# Patient Record
Sex: FEMALE | Race: WHITE
[De-identification: names, ages, dates, MRNs, and addresses within clinical notes are randomized per-mention and may not be internally consistent; named-entity substitution may affect disease eponyms.]

---

## 2017-07-27 ENCOUNTER — HOSPITAL ENCOUNTER (EMERGENCY)
Dept: HOSPITAL 17 - PHED | Age: 62
Discharge: HOME | End: 2017-07-27
Payer: COMMERCIAL

## 2017-07-27 VITALS
RESPIRATION RATE: 20 BRPM | OXYGEN SATURATION: 93 % | HEART RATE: 89 BPM | SYSTOLIC BLOOD PRESSURE: 137 MMHG | DIASTOLIC BLOOD PRESSURE: 58 MMHG

## 2017-07-27 VITALS
HEART RATE: 101 BPM | SYSTOLIC BLOOD PRESSURE: 192 MMHG | DIASTOLIC BLOOD PRESSURE: 95 MMHG | OXYGEN SATURATION: 92 % | RESPIRATION RATE: 18 BRPM | TEMPERATURE: 97.8 F

## 2017-07-27 VITALS — DIASTOLIC BLOOD PRESSURE: 64 MMHG | SYSTOLIC BLOOD PRESSURE: 167 MMHG

## 2017-07-27 VITALS — WEIGHT: 172.62 LBS | BODY MASS INDEX: 28.76 KG/M2 | HEIGHT: 65 IN

## 2017-07-27 VITALS — OXYGEN SATURATION: 96 %

## 2017-07-27 VITALS — OXYGEN SATURATION: 97 %

## 2017-07-27 DIAGNOSIS — I10: ICD-10-CM

## 2017-07-27 DIAGNOSIS — R51: ICD-10-CM

## 2017-07-27 DIAGNOSIS — J44.1: Primary | ICD-10-CM

## 2017-07-27 DIAGNOSIS — Z79.899: ICD-10-CM

## 2017-07-27 DIAGNOSIS — Z72.0: ICD-10-CM

## 2017-07-27 LAB
ANION GAP SERPL CALC-SCNC: 9 MEQ/L (ref 5–15)
APTT BLD: 23.8 SEC (ref 24.3–30.1)
BASE EXCESS BLD CALC-SCNC: 0 MMOL/L (ref -2–2)
BASOPHILS # BLD AUTO: 0 TH/MM3 (ref 0–0.2)
BASOPHILS NFR BLD: 0.3 % (ref 0–2)
BENZODIAZEPINES PNL UR: 91 % (ref 90–100)
BLOOD GAS CARBOXYHEMOGLOBIN: 2.4 % (ref 0–4)
BLOOD GAS HCO3: 24 MMOL/L (ref 22–26)
BLOOD GAS OXYGEN CONTENT: 18.8 VOL % (ref 12–20)
BLOOD GAS PCO2: 37 MMHG (ref 38–42)
BUN SERPL-MCNC: 14 MG/DL (ref 7–18)
CHLORIDE SERPL-SCNC: 102 MEQ/L (ref 98–107)
CRITICAL VALUE: NO
DRAW SITE: (no result)
EOSINOPHIL # BLD: 0 TH/MM3 (ref 0–0.4)
EOSINOPHIL NFR BLD: 0.1 % (ref 0–4)
ERYTHROCYTE [DISTWIDTH] IN BLOOD BY AUTOMATED COUNT: 12.4 % (ref 11.6–17.2)
GFR SERPLBLD BASED ON 1.73 SQ M-ARVRAT: 60 ML/MIN (ref 89–?)
HCO3 BLD-SCNC: 26.4 MEQ/L (ref 21–32)
HCT VFR BLD CALC: 43 % (ref 35–46)
HEMO FLAGS: (no result)
INR PPP: 0.9 RATIO
LYMPHOCYTES # BLD AUTO: 2.3 TH/MM3 (ref 1–4.8)
LYMPHOCYTES NFR BLD AUTO: 18.4 % (ref 9–44)
MAGNESIUM SERPL-MCNC: 2 MG/DL (ref 1.5–2.5)
MCH RBC QN AUTO: 29.7 PG (ref 27–34)
MCHC RBC AUTO-ENTMCNC: 33.7 % (ref 32–36)
MCV RBC AUTO: 88.2 FL (ref 80–100)
METHGB MFR BLDA: 1.2 % (ref 0–2)
MONOCYTES NFR BLD: 2.9 % (ref 0–8)
NEUTROPHILS # BLD AUTO: 9.8 TH/MM3 (ref 1.8–7.7)
NEUTROPHILS NFR BLD AUTO: 78.3 % (ref 16–70)
NUMBER OF ARTERIAL PUNCTURES: 1
O2/TOTAL GAS SETTING VFR VENT: 21 %
OXYGEN DEVICE: (no result)
PLATELET # BLD: 420 TH/MM3 (ref 150–450)
PO2 BLD: 71 MMHG (ref 61–120)
POTASSIUM SERPL-SCNC: 4.2 MEQ/L (ref 3.5–5.1)
PROTHROMBIN TIME: 9.9 SEC (ref 9.8–11.6)
RBC # BLD AUTO: 4.87 MIL/MM3 (ref 4–5.3)
SALICYLATES SERPL-MCNC: 14.7 G/DL (ref 12–16)
SODIUM SERPL-SCNC: 137 MEQ/L (ref 136–145)
STAT: YES
TEMP CORR TO: 98.6
ULNAR PULSE: PRESENT
WBC # BLD AUTO: 12.5 TH/MM3 (ref 4–11)

## 2017-07-27 PROCEDURE — 80048 BASIC METABOLIC PNL TOTAL CA: CPT

## 2017-07-27 PROCEDURE — 85025 COMPLETE CBC W/AUTO DIFF WBC: CPT

## 2017-07-27 PROCEDURE — 84484 ASSAY OF TROPONIN QUANT: CPT

## 2017-07-27 PROCEDURE — 93005 ELECTROCARDIOGRAM TRACING: CPT

## 2017-07-27 PROCEDURE — 94640 AIRWAY INHALATION TREATMENT: CPT

## 2017-07-27 PROCEDURE — 70450 CT HEAD/BRAIN W/O DYE: CPT

## 2017-07-27 PROCEDURE — 83880 ASSAY OF NATRIURETIC PEPTIDE: CPT

## 2017-07-27 PROCEDURE — 96375 TX/PRO/DX INJ NEW DRUG ADDON: CPT

## 2017-07-27 PROCEDURE — 94664 DEMO&/EVAL PT USE INHALER: CPT

## 2017-07-27 PROCEDURE — 85610 PROTHROMBIN TIME: CPT

## 2017-07-27 PROCEDURE — 82805 BLOOD GASES W/O2 SATURATION: CPT

## 2017-07-27 PROCEDURE — 71010: CPT

## 2017-07-27 PROCEDURE — 96374 THER/PROPH/DIAG INJ IV PUSH: CPT

## 2017-07-27 PROCEDURE — 85730 THROMBOPLASTIN TIME PARTIAL: CPT

## 2017-07-27 PROCEDURE — 99285 EMERGENCY DEPT VISIT HI MDM: CPT

## 2017-07-27 PROCEDURE — 83735 ASSAY OF MAGNESIUM: CPT

## 2017-07-27 PROCEDURE — 36600 WITHDRAWAL OF ARTERIAL BLOOD: CPT

## 2017-07-27 RX ADMIN — IPRATROPIUM BROMIDE AND ALBUTEROL SULFATE SCH AMPULE: .5; 3 SOLUTION RESPIRATORY (INHALATION) at 13:55

## 2017-07-27 NOTE — RADRPT
EXAM DATE/TIME:  07/27/2017 14:20 

 

[facility] COMPARISON:     

No previous studies available for comparison.

 

 

INDICATIONS :     

Cephalgia. 

                      

 

RADIATION DOSE:     

60.83 CTDIvol (mGy) 

 

 

 

MEDICAL HISTORY :     

Chronic obstructive pulmonary disease. Hypertension. 

 

SURGICAL HISTORY :      

Cholecystectomy. Tubal ligation.

 

ENCOUNTER:      

Initial

 

ACUITY:      

1 week

 

PAIN SCALE:      

6/10

 

LOCATION:        

cranial 

 

TECHNIQUE:     

Multiple contiguous axial images were obtained of the head.  Using automated exposure control and adj
ustment of the mA and/or kV according to patient size, radiation dose was kept as low as reasonably a
chievable to obtain optimal diagnostic quality images.   DICOM format image data is available electro
nically for review and comparison.  

 

FINDINGS:     

 

CEREBRUM:     

The ventricles are normal for age.  No evidence of midline shift, mass lesion, hemorrhage or acute in
farction.  No extra-axial fluid collections are seen.

 

POSTERIOR FOSSA:     

The cerebellum and brainstem are intact.  The 4th ventricle is midline.  The cerebellopontine angle i
s unremarkable.

 

EXTRACRANIAL:     

The visualized portion of the orbits is intact.

 

SKULL:     

The calvaria is intact.  No evidence of skull fracture.

 

CONCLUSION:     

1. No acute intracranial normality.

 

 

 

 Tyler Asher MD on July 27, 2017 at 14:54           

Board Certified Radiologist.

 This report was verified electronically.

## 2017-07-27 NOTE — PD
HPI


Chief Complaint:  Respiratory Symptoms


Time Seen by Provider:  13:36


Travel History


International Travel<30 days:  No


Contact w/Intl Traveler<30days:  No


Traveled to known affect area:  No





History of Present Illness


HPI


63yo F with PMH of COPD presents to the ED with c/o persistent sob that is 

worst the last 3 days.  Pt was seen by her pulmonologist on July 18 and was 

given azithromycin and steroids.  Pt finished with the azithromycin and is 

still on steroid taper.  States usually she will feel better by now but is not 

feeling better.  Feels midsternal chest tightness as well with coughing.  No 

chest pain.  Feels like her COPD.  +Cough.  Denies any fever, n/v, abdominal 

pain.  Pt also with occipital headache for a week.  States she sometimes gets 

headaches when she has COPD exacerbations but usually motrin helps.  No focal 

weakness or numbness or trauma.





PFSH


Past Medical History


Arthritis:  Yes


Asthma:  Yes


Heart Rhythm Problems:  No


Cancer:  Yes (SKIN)


Cardiovascular Problems:  Yes (HTN)


High Cholesterol:  Yes


Chest Pain:  No


Congestive Heart Failure:  No


COPD:  Yes


Cerebrovascular Accident:  No


Diminished Hearing:  No


GERD:  No


Genitourinary:  No


Headaches:  No


Hepatitis:  No


Hiatal Hernia:  Yes


Hypertension:  Yes


Kidney Stones:  No


Musculoskeletal:  Yes


Neurologic:  No


Psychiatric:  Yes


Reproductive:  No


Respiratory:  Yes (COPD)


Migraines:  No


Myocardial Infarction:  No


Renal Failure:  No


Seizures:  No


Sleep Apnea:  No


Ulcer:  No


Tetanus Vaccination:  Unknown


Influenza Vaccination:  No


Pregnant?:  Not Pregnant


Menopausal:  Yes


Tubal Ligation:  Yes





Past Surgical History


Abdominal Surgery:  Yes (CHOLECYSTECTOMY)


Appendectomy:  No


Cardiac Surgery:  No


Cholecystectomy:  Yes


Ear Surgery:  No


Endocrine Surgery:  No


Eye Surgery:  No


Genitourinary Surgery:  No


Gynecologic Surgery:  Yes (TUBAL LIGATION)


Oral Surgery:  No


Thoracic Surgery:  No


Other Surgery:  Yes (cancer in neck, removed)





Social History


Alcohol Use:  No


Tobacco Use:  Yes (occ)


Substance Use:  No





Allergies-Medications


(Allergen,Severity, Reaction):  


Coded Allergies:  


     No Known Allergies (Verified , 7/27/17)


Reported Meds & Prescriptions





Reported Meds & Active Scripts


Active


Reported


Advair Diskus Inh (Fluticasone-Salmeterol Inh) 250-50 Mcg/Blist Aer 1 Puff INH 

BID


     Rinse mouth after use.


Triamterene-Hydrochlorothiazide 37.5-25 Mg Cap 1 Cap PO DAILY








Review of Systems


Except as stated in HPI:  all other systems reviewed are Neg





Physical Exam


Narrative


GENERAL: 63yo F in mild distress.


SKIN: Focused skin assessment warm/dry.


HEAD: Atraumatic. Normocephalic. 


EYES: Pupils equal and round. No scleral icterus. No injection or drainage. 


ENT: No nasal bleeding or discharge.  Mucous membranes pink and moist.


NECK: Trachea midline. No JVD. 


CARDIOVASCULAR: Regular rate and rhythm.  No murmur appreciated.


RESPIRATORY: Mild accessory muscle use. Expiratory wheezing bilaterally.  

Saturating well at 94% on RA.


GASTROINTESTINAL: Abdomen soft, non-tender, nondistended. 


MUSCULOSKELETAL: No obvious deformities. No clubbing.  No cyanosis.  +Trace 

lower extremity edema. No calf tenderness.


NEUROLOGICAL: Awake and alert. No obvious cranial nerve deficits.  Motor 

grossly within normal limits. Normal speech.


PSYCHIATRIC: Appropriate mood and affect; insight and judgment normal.





Data


Data


Last Documented VS





Vital Signs








  Date Time  Temp Pulse Resp B/P Pulse Ox O2 Delivery O2 Flow Rate FiO2


 


7/27/17 14:56  80   98 Room Air  


 


7/27/17 14:47   20 137/58    


 


7/27/17 13:21 97.8       








Orders





 Complete Blood Count With Diff (7/27/17 13:43)


Basic Metabolic Panel (Bmp) (7/27/17 13:43)


B-Type Natriuretic Peptide (7/27/17 13:43)


Act Partial Throm Time (Ptt) (7/27/17 13:43)


Prothrombin Time / Inr (Pt) (7/27/17 13:43)


Magnesium (Mg) (7/27/17 13:43)


Troponin I (7/27/17 13:43)


Arterial Blood Gas (Abg) (7/27/17 13:43)


Iv Access Insert/Monitor (7/27/17 13:43)


Electrocardiogram (7/27/17 13:43)


Ecg Monitoring (7/27/17 13:43)


Oximetry (7/27/17 13:43)


Oxygen Administration (7/27/17 13:43)


Chest, Single Ap (7/27/17 13:43)


Sodium Chloride 0.9% Flush (Ns Flush) (7/27/17 13:45)


Methylprednisolone So Succ Inj (Solumedr (7/27/17 13:45)


Albuterol-Ipratropium Neb (Duoneb Neb) (7/27/17 13:45)


Ct Brain W/O Iv Contrast(Rout) (7/27/17 )


Ketorolac Inj (Toradol Inj) (7/27/17 15:15)





Labs








 Laboratory Tests








Test 7/27/17 7/27/17





 13:55 13:58


 


White Blood Count 12.5 TH/MM3 


 


Red Blood Count 4.87 MIL/MM3 


 


Hemoglobin 14.5 GM/DL 


 


Hematocrit 43.0 % 


 


Mean Corpuscular Volume 88.2 FL 


 


Mean Corpuscular Hemoglobin 29.7 PG 


 


Mean Corpuscular Hemoglobin 33.7 % 





Concent  


 


Red Cell Distribution Width 12.4 % 


 


Platelet Count 420 TH/MM3 


 


Mean Platelet Volume 7.4 FL 


 


Neutrophils (%) (Auto) 78.3 % 


 


Lymphocytes (%) (Auto) 18.4 % 


 


Monocytes (%) (Auto) 2.9 % 


 


Eosinophils (%) (Auto) 0.1 % 


 


Basophils (%) (Auto) 0.3 % 


 


Neutrophils # (Auto) 9.8 TH/MM3 


 


Lymphocytes # (Auto) 2.3 TH/MM3 


 


Monocytes # (Auto) 0.4 TH/MM3 


 


Eosinophils # (Auto) 0.0 TH/MM3 


 


Basophils # (Auto) 0.0 TH/MM3 


 


CBC Comment DIFF FINAL  


 


Differential Comment   


 


Prothrombin Time 9.9 SEC 


 


Prothromb Time International 0.9 RATIO 





Ratio  


 


Activated Partial 23.8 SEC 





Thromboplast Time  


 


Sodium Level 137 MEQ/L 


 


Potassium Level 4.2 MEQ/L 


 


Chloride Level 102 MEQ/L 


 


Carbon Dioxide Level 26.4 MEQ/L 


 


Anion Gap 9 MEQ/L 


 


Blood Urea Nitrogen 14 MG/DL 


 


Creatinine 0.94 MG/DL 


 


Estimat Glomerular Filtration 60 ML/MIN 





Rate  


 


Random Glucose 125 MG/DL 


 


Calcium Level 9.5 MG/DL 


 


Magnesium Level 2.0 MG/DL 


 


Troponin I LESS THAN 0.02 





 NG/ML 


 


B-Type Natriuretic Peptide 36 PG/ML 


 


Blood Gas Puncture Site  LT RADIAL 


 


Blood Gas Patient Temperature  98.6 


 


Blood Gas HCO3  24 mmol/L


 


Blood Gas Base Excess  0.0 mmol/L


 


Blood Gas Oxygen Saturation  91 %


 


Arterial Blood pH  7.42 


 


Arterial Blood Partial  37 mmHG





Pressure CO2  


 


Arterial Blood Partial  71 mmHG





Pressure O2  


 


Arterial Blood Oxygen Content  18.8 Vol %


 


Arterial Blood  2.4 %





Carboxyhemoglobin  


 


Arterial Blood Methemoglobin  1.2 %


 


Blood Gas Hemoglobin  14.7 G/DL


 


Oxygen Delivery Device  RA 


 


Blood Gas Inspired Oxygen  21 %














MDM


Medical Decision Making


Medical Screen Exam Complete:  Yes


Emergency Medical Condition:  Yes


Interpretation(s)


EKG: NSR 83bpm.  Normal axis.  No ST segment elevation or depression.  





Laboratory Tests








Test 7/27/17 7/27/17





 13:55 13:58


 


White Blood Count 12.5 TH/MM3 





 (4.0-11.0) 


 


Red Blood Count 4.87 MIL/MM3 





 (4.00-5.30) 


 


Hemoglobin 14.5 GM/DL 





 (11.6-15.3) 


 


Hematocrit 43.0 % 





 (35.0-46.0) 


 


Mean Corpuscular Volume 88.2 FL 





 (80.0-100.0) 


 


Mean Corpuscular Hemoglobin 29.7 PG 





 (27.0-34.0) 


 


Mean Corpuscular Hemoglobin 33.7 % 





Concent (32.0-36.0) 


 


Red Cell Distribution Width 12.4 % 





 (11.6-17.2) 


 


Platelet Count 420 TH/MM3 





 (150-450) 


 


Mean Platelet Volume 7.4 FL 





 (7.0-11.0) 


 


Neutrophils (%) (Auto) 78.3 % 





 (16.0-70.0) 


 


Lymphocytes (%) (Auto) 18.4 % 





 (9.0-44.0) 


 


Monocytes (%) (Auto) 2.9 % (0.0-8.0) 


 


Eosinophils (%) (Auto) 0.1 % (0.0-4.0) 


 


Basophils (%) (Auto) 0.3 % (0.0-2.0) 


 


Neutrophils # (Auto) 9.8 TH/MM3 





 (1.8-7.7) 


 


Lymphocytes # (Auto) 2.3 TH/MM3 





 (1.0-4.8) 


 


Monocytes # (Auto) 0.4 TH/MM3 





 (0-0.9) 


 


Eosinophils # (Auto) 0.0 TH/MM3 





 (0-0.4) 


 


Basophils # (Auto) 0.0 TH/MM3 





 (0-0.2) 


 


CBC Comment DIFF FINAL  


 


Differential Comment   


 


Prothrombin Time 9.9 SEC 





 (9.8-11.6) 


 


Prothromb Time International 0.9 RATIO 





Ratio  


 


Activated Partial 23.8 SEC 





Thromboplast Time (24.3-30.1) 


 


Sodium Level 137 MEQ/L 





 (136-145) 


 


Potassium Level 4.2 MEQ/L 





 (3.5-5.1) 


 


Chloride Level 102 MEQ/L 





 () 


 


Carbon Dioxide Level 26.4 MEQ/L 





 (21.0-32.0) 


 


Anion Gap 9 MEQ/L (5-15) 


 


Blood Urea Nitrogen 14 MG/DL (7-18) 


 


Creatinine 0.94 MG/DL 





 (0.50-1.00) 


 


Estimat Glomerular Filtration 60 ML/MIN (>89) 





Rate  


 


Random Glucose 125 MG/DL 





 () 


 


Calcium Level 9.5 MG/DL 





 (8.5-10.1) 


 


Magnesium Level 2.0 MG/DL 





 (1.5-2.5) 


 


Troponin I LESS THAN 0.02 





 NG/ML 





 (0.02-0.05) 


 


B-Type Natriuretic Peptide 36 PG/ML 





 (0-100) 


 


Blood Gas Puncture Site  LT RADIAL 


 


Blood Gas Patient Temperature  98.6 


 


Blood Gas HCO3  24 mmol/L





  (22-26)


 


Blood Gas Base Excess  0.0 mmol/L





  (-2-2)


 


Blood Gas Oxygen Saturation  91 % ()


 


Arterial Blood pH  7.42





  (7.380-7.420)


 


Arterial Blood Partial  37 mmHG (38-42)





Pressure CO2  


 


Arterial Blood Partial  71 mmHG





Pressure O2  ()


 


Arterial Blood Oxygen Content  18.8 Vol %





  (12.0-20.0)


 


Arterial Blood  2.4 % (0-4)





Carboxyhemoglobin  


 


Arterial Blood Methemoglobin  1.2 % (0-2)


 


Blood Gas Hemoglobin  14.7 G/DL





  (12.0-16.0)


 


Oxygen Delivery Device  RA 


 


Blood Gas Inspired Oxygen  21 %








Last Impressions








Chest X-Ray 7/27/17 1343 Signed





Impressions: 





 Service Date/Time:  Thursday, July 27, 2017 14:11 - CONCLUSION:  1. No acute 





 cardiopulmonary disease.     Tyler Asher MD 


 


Head CT 7/27/17 0000 Signed





Impressions: 





 Service Date/Time:  Thursday, July 27, 2017 14:20 - CONCLUSION:  1. No acute 





 intracranial normality.     Tyler Asher MD 








Differential Diagnosis


COPD exacerbation vs. Pneumonia vs. atypical chest pain vs. CHF


Narrative Course


63yo F with wheezing and complaint of sob.  Impression is COPD exacerbation.  

Labs reviewed, mild leukocytosis at 12.5.  Pt is on prednisone taper.  Troponin 

negative.  BNP 36.  ABG unremarkable.  CXR showed no acute cardiopulmonary 

disease.  CT brain negative.  Pt given duonebs x3 and methylprednisolone as 

well as toradol for headache.  Pt reevaluated at bedside and states she feels 

much better and denies any more chest tightness. Headache has also resolved.  

Pt is well appearing and speaking in complete sentences with no sob.  Pt has 

pulmonologist to follow up with.  Return precautions given.





Diagnosis





 Primary Impression:  


 COPD exacerbation


Patient Instructions:  General Instructions


Departure Forms:  Tests/Procedures, Work Release   Enter return to work date:  

Jul 30, 2017





***Additional Instructions:


Please follow up with your pulmonologist in 1-2 days.  Return to the ED if 

symptoms worsen.


***Med/Other Pt SpecificInfo:  Prescription(s) given


Scripts


Prednisone (Deltasone)20 Mg Tab20 Mg PO BID  5 Days  Ref 0


   Prov:Paola Mejía DO         7/27/17 


Albuterol 18 GM Inh (Ventolin Hfa 18 GM Inh)90 Mcg/Act Aer2 Puff INH Q4H PRN (

SHORTNESS OF BREATH) #1 INHALER  Ref 0


   Prov:Paola Mejía DO         7/27/17


Disposition:  01 DISCHARGE HOME


Condition:  Stable








Paola Mejía DO Jul 27, 2017 13:50

## 2017-07-27 NOTE — EKG
Date Performed: 07/27/2017       Time Performed: 14:15:35

 

PTAGE:      62 years

 

EKG:      Sinus rhythm 

 

 NORMAL ECG No significant change from prior electrocardiogram. 

 

 PREVIOUS TRACING            : 10/13/2009 14.58

 

DOCTOR:   Lazaro Taylor  Interpretating Date/Time  07/27/2017 16:11:50

## 2017-07-27 NOTE — RADRPT
EXAM DATE/TIME:  07/27/2017 14:11 

 

[facility] COMPARISON:     

No previous studies available for comparison.

 

                     

INDICATIONS :     

Short of breath.

                     

 

MEDICAL HISTORY :     

Chronic obstructive pulmonary disease.          

 

SURGICAL HISTORY :     

None.   

 

ENCOUNTER:     

Initial                                        

 

ACUITY:     

1 day      

 

PAIN SCORE:     

0/10

 

LOCATION:     

Bilateral chest 

 

FINDINGS:     

A single view of the chest demonstrates the lungs to be symmetrically aerated without evidence of mas
s, infiltrate or effusion.  The cardiomediastinal contours are unremarkable.  Osseous structures are 
intact.

 

CONCLUSION:     

1. No acute cardiopulmonary disease.

 

 

 

 Tyler Asher MD on July 27, 2017 at 14:22           

Board Certified Radiologist.

 This report was verified electronically.

## 2018-02-20 ENCOUNTER — HOSPITAL ENCOUNTER (OUTPATIENT)
Dept: HOSPITAL 17 - HSDC | Age: 63
Discharge: HOME | End: 2018-02-20
Attending: OTOLARYNGOLOGY
Payer: COMMERCIAL

## 2018-02-20 VITALS — BODY MASS INDEX: 28.64 KG/M2 | WEIGHT: 167.77 LBS | HEIGHT: 64 IN

## 2018-02-20 VITALS
TEMPERATURE: 97.4 F | RESPIRATION RATE: 18 BRPM | SYSTOLIC BLOOD PRESSURE: 99 MMHG | HEART RATE: 88 BPM | DIASTOLIC BLOOD PRESSURE: 62 MMHG | OXYGEN SATURATION: 93 %

## 2018-02-20 DIAGNOSIS — J43.9: ICD-10-CM

## 2018-02-20 DIAGNOSIS — D11.0: Primary | ICD-10-CM

## 2018-02-20 DIAGNOSIS — I10: ICD-10-CM

## 2018-02-20 PROCEDURE — 88305 TISSUE EXAM BY PATHOLOGIST: CPT

## 2018-02-20 PROCEDURE — 38510 BIOPSY/REMOVAL LYMPH NODES: CPT

## 2018-02-20 PROCEDURE — 88307 TISSUE EXAM BY PATHOLOGIST: CPT

## 2018-02-20 PROCEDURE — 00100 ANES PX SALIVARY GLAND W/BX: CPT

## 2018-02-20 PROCEDURE — 42415 EXCISE PAROTID GLAND/LESION: CPT

## 2018-02-23 NOTE — MP
cc:

CARIN MIRANDA MD

****

 

 

DATE OF SURGERY

2/20/2018

 

SURGEON

Dr. Carin Miranda

 

PREOPERATIVE DIAGNOSIS

Left parotid tumor.

 

POSTOPERATIVE DIAGNOSIS

Left parotid tumor.

 

OPERATION PERFORMED

Left superficial parotidectomy with facial nerve preservation.

 

INDICATIONS

The indications are documented in the history and physical.

 

DESCRIPTION OF OPERATION

The patient was taken to OR #10 and placed in the supine position.  Following

induction of general anesthesia and intubation, a shoulder roll was placed and

the patient was positioned for surgery of the left parotid.  The skin of the

left face and neck was marked for modified Chinmay incision and then this line

was injected with 6 mL of 1:100,000 solution of epinephrine without lidocaine.

The facial nerve monitor was attached by the neuromonitor technician and proved

to be in working condition.  The patient was then prepped and draped for

surgery.

 

The modified Chinmay incision was made down to the level of the parotid fascia

and the skin flaps were elevated anteriorly, a distance approximately 5 cm at

the widest point.  This allowed easy access to the tumor which involved the

inferior one-half of the parotid gland.  Dissection began posteriorly in the

tail of the parotid area, elevating the gland and tumor from the underlying

sternocleidomastoid muscle.  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

continued superiorly along its capsular surface and along the surface of the

parotid gland.  Branches of the facial nerve were encountered along the

superior aspect of the tumor and it was carefully dissected free from these

branches.  There was no evidence of nerve trauma from the monitor during this

process.  Dissection then continued inferiorly around the anterior surface of

the parotid capsule until the tumor and a portion of the parotid gland were

fully mobilized.  This was then passed off the field as specimen.

 

The wound was then irrigated and suctioned.  It was also treated with 10,000

units of topical thrombin to assist with hemostasis.  Dead space of the parotid

gland was obliterated using 3-0 Vicryl sutures and the skin was then redraped

over the surface of the wound.  Closure was begun using 4-0 Vicryl subcutaneous

interrupted and 5-0 fast-absorbing plain gut in the outer layer of skin.  A

face-lift dressing was applied and the procedure was terminated.  The patient

was reversed from anesthesia and taken to Recovery in good condition.  There

were no complications.  Blood loss was 40 mL.

 

 

                              _________________________________

                              MD TOMER Gunderson

D:  2/23/2018/6:18 AM

T:  2/23/2018/8:52 AM

Visit #:  [id number]

Job #:  [id number]